# Patient Record
Sex: MALE | Race: WHITE | NOT HISPANIC OR LATINO | URBAN - METROPOLITAN AREA
[De-identification: names, ages, dates, MRNs, and addresses within clinical notes are randomized per-mention and may not be internally consistent; named-entity substitution may affect disease eponyms.]

---

## 2019-02-27 ENCOUNTER — IMPORTED ENCOUNTER (OUTPATIENT)
Dept: URBAN - METROPOLITAN AREA CLINIC 43 | Facility: CLINIC | Age: 84
End: 2019-02-27

## 2019-02-27 PROBLEM — H01.00B: Noted: 2019-02-27

## 2019-02-27 PROBLEM — H01.00A: Noted: 2019-02-27

## 2019-02-27 PROBLEM — H25.013: Noted: 2019-02-27

## 2019-02-27 PROBLEM — H25.042: Noted: 2019-02-27

## 2019-02-27 PROBLEM — H16.223: Noted: 2019-02-27

## 2019-02-27 PROBLEM — H25.13: Noted: 2019-02-27

## 2020-04-19 ASSESSMENT — KERATOMETRY
OS_K1POWER_DIOPTERS: 45.75
OD_K1POWER_DIOPTERS: 46
OS_K2POWER_DIOPTERS: 45
OD_K2POWER_DIOPTERS: 45.5
OD_AXISANGLE2_DEGREES: 90
OS_AXISANGLE_DEGREES: 75
OS_AXISANGLE2_DEGREES: 165
OD_AXISANGLE_DEGREES: 180

## 2020-04-19 ASSESSMENT — VISUAL ACUITY
OD_SC: J16
OS_SC: J16
OD_CC: J2
OS_OTHER: 20/70-.
OS_SC: 20/80-2
OD_SC: 20/70+1
OS_CC: 20/40+2
OD_OTHER: 20/100.
OS_CC: J2
OD_CC: 20/30+1

## 2020-04-19 ASSESSMENT — TONOMETRY
OD_IOP_MMHG: 18.0
OS_IOP_MMHG: 16.0

## 2022-06-04 ENCOUNTER — TELEPHONE ENCOUNTER (OUTPATIENT)
Dept: URBAN - METROPOLITAN AREA CLINIC 68 | Facility: CLINIC | Age: 87
End: 2022-06-04

## 2022-06-05 ENCOUNTER — TELEPHONE ENCOUNTER (OUTPATIENT)
Dept: URBAN - METROPOLITAN AREA CLINIC 68 | Facility: CLINIC | Age: 87
End: 2022-06-05

## 2022-06-25 ENCOUNTER — TELEPHONE ENCOUNTER (OUTPATIENT)
Age: 87
End: 2022-06-25

## 2022-06-26 ENCOUNTER — TELEPHONE ENCOUNTER (OUTPATIENT)
Age: 87
End: 2022-06-26

## 2023-03-13 ENCOUNTER — OFFICE VISIT (OUTPATIENT)
Dept: URBAN - METROPOLITAN AREA CLINIC 68 | Facility: CLINIC | Age: 88
End: 2023-03-13

## 2023-03-13 ENCOUNTER — DASHBOARD ENCOUNTERS (OUTPATIENT)
Age: 88
End: 2023-03-13

## 2023-03-13 RX ORDER — SOD SULF/POT CHLORIDE/MAG SULF 1.479 G
24 TABLETS AS DIRECTED TABLET ORAL ONCE
Qty: 24 TABLET | Refills: 0 | OUTPATIENT
Start: 2023-03-13 | End: 2023-03-14

## 2023-03-13 RX ORDER — APIXABAN 2.5 MG/1
AS DIRECTED TABLET, FILM COATED ORAL
Status: ACTIVE | COMMUNITY

## 2023-03-13 NOTE — EXAM-MIGRATED EXAMINATIONS
General Examination: General appearance: -> alert, pleasant, well-nourished and in no acute distress   Head: -> normocephalic, atraumatic   Eyes: -> pupils equal, round, reactive to light and accommodation   Ears: -> normal   Nose: -> no lesions   Oral cavity: -> normal , mucosa moist , tongue is midline , with good dentition   Chest: -> chest wall with no costochondral junction tenderness, no rib deformity and normal shape and expansion   Abdomen: -> soft with good bowel sounds, , and no masses or hepatosplenomegaly redundant abdominal wall with some lower abdominal wall tender ness , no clear cut abdominal wall hernia   Rectal: -> not examined   Musculoskeletal: -> no swelling, redness, warmth or tenderness of the shoulder(s) with full range of motion   Extremities: -> normal extremity with no clubbing, cyanosis or edema   Neurologic: -> nonfocal , alert and oriented   Throat: -> clear   Neck / thyroid: -> neck is supple, with full range of motion and no cervical lymphadenopathy , no masses and/or tenderness , no jugular venous distention , trachea midline   Lymph nodes: -> no axillary, supraclavicular or inguinal lymphadenopathy   Skin: -> skin is warm and dry, with no rashes, good skin turgor and normal hair distribution   Heart: -> regular rate and rhythm without murmurs, gallops, clicks or rubs   Lungs: -> clear to auscultation bilaterally, with good air movement and no rales, rhonchi or wheezes

## 2023-03-13 NOTE — HPI-MIGRATED HPI
General : Recurrent pain in the LLQ , had MRI showed diverticulosis ,and thickening , last colon 2 years ago, Dr reveles Family history of colon cancer  Urology and PCP did not seem too concerned and pt was upset Would have constipation, unless take daily fruit figs, escarole, also, daily fiber and MiraLAX,  no bleeding or obstruction unclear reason for anticoagulation, Eliquis " to prevent stroke,"

## 2023-04-14 ENCOUNTER — OFFICE VISIT (OUTPATIENT)
Dept: URBAN - METROPOLITAN AREA SURGERY CENTER 12 | Facility: SURGERY CENTER | Age: 88
End: 2023-04-14

## 2023-04-14 RX ORDER — APIXABAN 2.5 MG/1
AS DIRECTED TABLET, FILM COATED ORAL
Status: ACTIVE | COMMUNITY

## 2025-02-06 ENCOUNTER — COMPREHENSIVE EXAM (OUTPATIENT)
Age: 89
End: 2025-02-06

## 2025-02-06 VITALS
BODY MASS INDEX: 36.65 KG/M2 | HEIGHT: 65 IN | SYSTOLIC BLOOD PRESSURE: 127 MMHG | HEART RATE: 69 BPM | DIASTOLIC BLOOD PRESSURE: 84 MMHG | WEIGHT: 220 LBS

## 2025-02-06 DIAGNOSIS — H35.3211: ICD-10-CM

## 2025-02-06 DIAGNOSIS — H02.833: ICD-10-CM

## 2025-02-06 DIAGNOSIS — H34.8110: ICD-10-CM

## 2025-02-06 DIAGNOSIS — H25.13: ICD-10-CM

## 2025-02-06 DIAGNOSIS — H02.836: ICD-10-CM

## 2025-02-06 DIAGNOSIS — H04.123: ICD-10-CM

## 2025-02-06 PROCEDURE — 92014 COMPRE OPH EXAM EST PT 1/>: CPT | Mod: 25

## 2025-02-06 PROCEDURE — 92235 FLUORESCEIN ANGRPH MLTIFRAME: CPT

## 2025-02-06 PROCEDURE — 92250 FUNDUS PHOTOGRAPHY W/I&R: CPT | Mod: 59

## 2025-02-06 PROCEDURE — 67028 INJECTION EYE DRUG: CPT

## 2025-02-06 PROCEDURE — J3490AVA AVASTIN *

## 2025-02-06 PROCEDURE — 92134 CPTRZ OPH DX IMG PST SGM RTA: CPT

## 2025-03-20 ENCOUNTER — CLINIC PROCEDURE ONLY (OUTPATIENT)
Age: 89
End: 2025-03-20

## 2025-03-20 DIAGNOSIS — H34.8110: ICD-10-CM

## 2025-03-20 PROCEDURE — 92250 FUNDUS PHOTOGRAPHY W/I&R: CPT | Mod: 59

## 2025-03-20 PROCEDURE — 67028 INJECTION EYE DRUG: CPT

## 2025-03-20 PROCEDURE — J3490AVA AVASTIN *

## 2025-03-20 PROCEDURE — 92134 CPTRZ OPH DX IMG PST SGM RTA: CPT
